# Patient Record
Sex: FEMALE | Race: WHITE
[De-identification: names, ages, dates, MRNs, and addresses within clinical notes are randomized per-mention and may not be internally consistent; named-entity substitution may affect disease eponyms.]

---

## 2017-06-06 ENCOUNTER — HOSPITAL ENCOUNTER (OUTPATIENT)
Dept: HOSPITAL 45 - C.LAB | Age: 52
Discharge: HOME | End: 2017-06-06
Payer: COMMERCIAL

## 2017-06-06 DIAGNOSIS — Z13.89: ICD-10-CM

## 2017-06-06 DIAGNOSIS — E03.9: ICD-10-CM

## 2017-06-06 DIAGNOSIS — D64.9: ICD-10-CM

## 2017-06-06 DIAGNOSIS — I10: Primary | ICD-10-CM

## 2017-06-06 DIAGNOSIS — M79.669: ICD-10-CM

## 2017-06-06 LAB
ANION GAP SERPL CALC-SCNC: 7 MMOL/L (ref 3–11)
BASOPHILS # BLD: 0.03 K/UL (ref 0–0.2)
BASOPHILS NFR BLD: 0.8 %
BUN SERPL-MCNC: 13 MG/DL (ref 7–18)
BUN/CREAT SERPL: 14.5 (ref 10–20)
CALCIUM SERPL-MCNC: 9 MG/DL (ref 8.5–10.1)
CHLORIDE SERPL-SCNC: 107 MMOL/L (ref 98–107)
CHOLEST/HDLC SERPL: 2.7 {RATIO}
CO2 SERPL-SCNC: 26 MMOL/L (ref 21–32)
COMPLETE: YES
CREAT SERPL-MCNC: 0.86 MG/DL (ref 0.6–1.2)
EOSINOPHIL NFR BLD AUTO: 237 K/UL (ref 130–400)
GLUCOSE SERPL-MCNC: 81 MG/DL (ref 70–99)
GLUCOSE UR QL: 64 MG/DL
HCT VFR BLD CALC: 37.9 % (ref 37–47)
IG%: 0 %
IMM GRANULOCYTES NFR BLD AUTO: 44.3 %
KETONES UR QL STRIP: 92 MG/DL
LYMPHOCYTES # BLD: 1.6 K/UL (ref 1.2–3.4)
MCH RBC QN AUTO: 32 PG (ref 25–34)
MCHC RBC AUTO-ENTMCNC: 33.2 G/DL (ref 32–36)
MCV RBC AUTO: 96.2 FL (ref 80–100)
MONOCYTES NFR BLD: 14.4 %
NEUTROPHILS # BLD AUTO: 3.3 %
NEUTROPHILS NFR BLD AUTO: 37.2 %
NITRITE UR QL STRIP: 69 MG/DL (ref 0–150)
PH UR: 170 MG/DL (ref 0–200)
PMV BLD AUTO: 11.7 FL (ref 7.4–10.4)
POTASSIUM SERPL-SCNC: 4.2 MMOL/L (ref 3.5–5.1)
RBC # BLD AUTO: 3.94 M/UL (ref 4.2–5.4)
SODIUM SERPL-SCNC: 140 MMOL/L (ref 136–145)
TSH SERPL-ACNC: 2.87 UIU/ML (ref 0.3–4.5)
VERY LOW DENSITY LIPOPROT CALC: 14 MG/DL
WBC # BLD AUTO: 3.61 K/UL (ref 4.8–10.8)

## 2017-12-07 ENCOUNTER — HOSPITAL ENCOUNTER (OUTPATIENT)
Dept: HOSPITAL 45 - C.LAB | Age: 52
Discharge: HOME | End: 2017-12-07
Payer: COMMERCIAL

## 2017-12-07 DIAGNOSIS — E03.9: ICD-10-CM

## 2017-12-07 DIAGNOSIS — I10: Primary | ICD-10-CM

## 2017-12-07 LAB
ANION GAP SERPL CALC-SCNC: 5 MMOL/L (ref 3–11)
BASOPHILS # BLD: 0.03 K/UL (ref 0–0.2)
BASOPHILS NFR BLD: 0.7 %
BUN SERPL-MCNC: 14 MG/DL (ref 7–18)
BUN/CREAT SERPL: 17.4 (ref 10–20)
CALCIUM SERPL-MCNC: 8.6 MG/DL (ref 8.5–10.1)
CHLORIDE SERPL-SCNC: 105 MMOL/L (ref 98–107)
CO2 SERPL-SCNC: 27 MMOL/L (ref 21–32)
COMPLETE: YES
CREAT SERPL-MCNC: 0.8 MG/DL (ref 0.6–1.2)
EOSINOPHIL NFR BLD AUTO: 224 K/UL (ref 130–400)
GLUCOSE SERPL-MCNC: 82 MG/DL (ref 70–99)
HCT VFR BLD CALC: 36.8 % (ref 37–47)
IG%: 0.2 %
IMM GRANULOCYTES NFR BLD AUTO: 31.7 %
LYMPHOCYTES # BLD: 1.32 K/UL (ref 1.2–3.4)
MCH RBC QN AUTO: 32.5 PG (ref 25–34)
MCHC RBC AUTO-ENTMCNC: 32.6 G/DL (ref 32–36)
MCV RBC AUTO: 99.7 FL (ref 80–100)
MONOCYTES NFR BLD: 10.8 %
NEUTROPHILS # BLD AUTO: 3.6 %
NEUTROPHILS NFR BLD AUTO: 53 %
PMV BLD AUTO: 11.7 FL (ref 7.4–10.4)
POTASSIUM SERPL-SCNC: 3.6 MMOL/L (ref 3.5–5.1)
RBC # BLD AUTO: 3.69 M/UL (ref 4.2–5.4)
SODIUM SERPL-SCNC: 137 MMOL/L (ref 136–145)
TSH SERPL-ACNC: 2.94 UIU/ML (ref 0.3–4.5)
WBC # BLD AUTO: 4.16 K/UL (ref 4.8–10.8)

## 2018-01-31 ENCOUNTER — HOSPITAL ENCOUNTER (OUTPATIENT)
Dept: HOSPITAL 45 - C.ULTR | Age: 53
Discharge: HOME | End: 2018-01-31
Payer: COMMERCIAL

## 2018-01-31 DIAGNOSIS — E04.2: Primary | ICD-10-CM

## 2018-01-31 NOTE — DIAGNOSTIC IMAGING REPORT
ULTRASOUND OF THE THYROID GLAND



CLINICAL HISTORY: Multinodular goiter.



COMPARISON STUDY: Thyroid ultrasound dated 4/9/2014.



TECHNIQUE: Real-time, grayscale, and color flow sonography of the thyroid gland

is performed utilizing a high-frequency linear transducer. Images are reviewed

in the transverse and longitudinal planes.



FINDINGS:



Right lobe: The right lobe of the thyroid gland is mildly enlarged and

heterogeneous in echotexture, measuring 5.7 x 2.6 x 2.8 cm. A lobulated solid

and cystic nodule in the mid to lower pole measures 3.3 x 2.2 x 2.3 cm (this is

not significantly changed but was previously measured as 2 discrete nodules).

Internal flow is seen on color imaging. A cystic nodule with minimal complexity

in the upper pole measures 1.2 x 0.7 x 1.0 cm. This nodule is increasingly

cystic from 2014.



Left lobe: The left lobe of the thyroid gland is normal in size and

heterogeneous in echotexture, measuring 4.7 x 1.7 x 1.7 cm. A solid and cystic

nodule in the mid to lower pole measures 3.0 x 1.6 x 1.4 cm (not significantly

changed but previously measured as 3 discrete nodules). Internal flow is seen on

color imaging. A coarse calcification is noted in the lower pole.



Isthmus: The thyroid isthmus is normal in appearance and measures 0.2 cm in AP

diameter.





IMPRESSION: Multinodular thyroid gland, not significantly changed in appearance

from previous. See above.







Electronically signed by:  Uriel Reyna M.D.

1/31/2018 1:25 PM



Dictated Date/Time:  1/31/2018 1:21 PM